# Patient Record
Sex: MALE | Race: WHITE | Employment: UNEMPLOYED | URBAN - METROPOLITAN AREA
[De-identification: names, ages, dates, MRNs, and addresses within clinical notes are randomized per-mention and may not be internally consistent; named-entity substitution may affect disease eponyms.]

---

## 2022-12-08 ENCOUNTER — HOSPITAL ENCOUNTER (EMERGENCY)
Age: 52
Discharge: LEFT BEFORE DISCHARGE | End: 2022-12-08
Payer: COMMERCIAL

## 2022-12-08 VITALS
SYSTOLIC BLOOD PRESSURE: 128 MMHG | OXYGEN SATURATION: 100 % | RESPIRATION RATE: 16 BRPM | DIASTOLIC BLOOD PRESSURE: 76 MMHG | HEART RATE: 82 BPM

## 2022-12-08 PROCEDURE — 99283 EMERGENCY DEPT VISIT LOW MDM: CPT

## 2022-12-09 NOTE — ED NOTES
[facility] arraigned transportation back to facility, patient made aware.      Yenifer Willams RN  12/08/22 [id number]

## 2022-12-09 NOTE — ED NOTES
Reviewed patient vitals with harry Kolb to finish triage in waiting room.      Gómez Sneed RN  12/08/22 [id number]

## 2022-12-09 NOTE — ED NOTES
Patient signed ama paperwork- aware of risk of leaving without treatment and continues to chose to leave Ruby Donnelly RN  12/08/22 [id number]

## 2022-12-09 NOTE — ED NOTES
Department of Emergency Medicine  FIRST PROVIDER TRIAGE NOTE             Independent MLP           12/8/22  9:04 PM EST    Date of Encounter: 12/8/22   MRN: [id number]      HPI: Daniela Heart is a 45 y.o. male who presents to the ED for Abscess (R and L arms, started on antibiotics at [facility])  Patient presents to the emergency department from [facility] for evaluation of abscesses to the bilateral forearms. Upon arrival patient is refusing to get a temperature done, and states that he just wants to return to [facility] and does not want to be treated here in the emergency department. ROS: Negative for cp or sob. PE: Gen Appearance/Constitutional: alert  CV: regular rate  Pulm: Patient refused further examination     Initial Plan of Care: All treatment areas with department are currently occupied. Plan to order/Initiate the following while awaiting opening in ED: Patient is refusing any treatment.   Initiate Treatment-Testing, Proceed toTreatment Area When Bed Available for ED Attending/MLP to Continue Care    Electronically signed by ELAINE Soni CNP   DD: 12/8/22       ELAINE Soni CNP  12/08/22 [id number]

## 2024-04-09 ENCOUNTER — PATIENT OUTREACH (OUTPATIENT)
Dept: CARE COORDINATION | Facility: CLINIC | Age: 54
End: 2024-04-09

## 2024-04-09 NOTE — PROGRESS NOTES
Discharge facility: [facility]  Discharge diagnosis: Shortness of breath, positive TB  Admission date: 4/3/24  Discharge date: 4/8/24  Follow Up Appointment Date: Needs scheduled     Outreach call to patient to support a smooth transition of care from recent admission.  Left voicemail message for patient with my contact information.     Jolanta Marquez RN

## 2024-04-23 ENCOUNTER — PATIENT OUTREACH (OUTPATIENT)
Dept: CARE COORDINATION | Facility: CLINIC | Age: 54
End: 2024-04-23

## 2024-04-23 NOTE — PROGRESS NOTES
Outreach call to patient to support a smooth transition of care from recent admission.  Left voicemail message for patient with my contact information.   Jolanta Marquez RN

## 2024-05-09 ENCOUNTER — PATIENT OUTREACH (OUTPATIENT)
Dept: CARE COORDINATION | Facility: CLINIC | Age: 54
End: 2024-05-09

## 2024-05-09 NOTE — PROGRESS NOTES
Attempts made to reach patient for [facility] outreach and no contact made. LVM w/ my name and contact information.   Jolanta Marquez RN